# Patient Record
Sex: MALE | Race: WHITE | NOT HISPANIC OR LATINO | URBAN - METROPOLITAN AREA
[De-identification: names, ages, dates, MRNs, and addresses within clinical notes are randomized per-mention and may not be internally consistent; named-entity substitution may affect disease eponyms.]

---

## 2018-02-22 ENCOUNTER — OUTSIDE PLACE OF SERVICE (OUTPATIENT)
Dept: ADMINISTRATIVE | Facility: OTHER | Age: 63
End: 2018-02-22
Payer: COMMERCIAL

## 2018-02-22 PROCEDURE — 99232 SBSQ HOSP IP/OBS MODERATE 35: CPT | Mod: ,,, | Performed by: THORACIC SURGERY (CARDIOTHORACIC VASCULAR SURGERY)

## 2018-02-26 ENCOUNTER — OUTSIDE PLACE OF SERVICE (OUTPATIENT)
Dept: ADMINISTRATIVE | Facility: OTHER | Age: 63
End: 2018-02-26
Payer: COMMERCIAL

## 2018-02-26 PROCEDURE — 33430 REPLACEMENT OF MITRAL VALVE: CPT | Mod: AS,,, | Performed by: NURSE PRACTITIONER

## 2018-02-26 PROCEDURE — 33511 CABG VEIN TWO: CPT | Mod: 51,,, | Performed by: THORACIC SURGERY (CARDIOTHORACIC VASCULAR SURGERY)

## 2018-02-26 PROCEDURE — 33508 ENDOSCOPIC VEIN HARVEST: CPT | Mod: ,,, | Performed by: THORACIC SURGERY (CARDIOTHORACIC VASCULAR SURGERY)

## 2018-02-26 PROCEDURE — 33511 CABG VEIN TWO: CPT | Mod: 51,AS,, | Performed by: NURSE PRACTITIONER

## 2018-02-26 PROCEDURE — 33259 ABLATE ATRIA W/BYPASS ADD-ON: CPT | Mod: ,,, | Performed by: THORACIC SURGERY (CARDIOTHORACIC VASCULAR SURGERY)

## 2018-02-26 PROCEDURE — 33430 REPLACEMENT OF MITRAL VALVE: CPT | Mod: ,,, | Performed by: THORACIC SURGERY (CARDIOTHORACIC VASCULAR SURGERY)

## 2018-02-26 PROCEDURE — 33259 ABLATE ATRIA W/BYPASS ADD-ON: CPT | Mod: AS,,, | Performed by: NURSE PRACTITIONER

## 2018-03-22 RX ORDER — ASPIRIN 325 MG
325 TABLET ORAL
COMMUNITY
Start: 2018-03-10

## 2018-03-22 RX ORDER — METFORMIN HYDROCHLORIDE 1000 MG/1
1000 TABLET ORAL
COMMUNITY

## 2018-03-22 RX ORDER — GLIMEPIRIDE 2 MG/1
2 TABLET ORAL
COMMUNITY
End: 2018-05-29

## 2018-03-22 RX ORDER — BENZONATATE 100 MG/1
100 CAPSULE ORAL
COMMUNITY
Start: 2018-03-09

## 2018-03-22 RX ORDER — PANTOPRAZOLE SODIUM 40 MG/1
40 TABLET, DELAYED RELEASE ORAL
COMMUNITY
Start: 2018-03-10

## 2018-03-22 RX ORDER — ATORVASTATIN CALCIUM 40 MG/1
40 TABLET, FILM COATED ORAL
COMMUNITY

## 2018-03-22 RX ORDER — WARFARIN 6 MG/1
6 TABLET ORAL
COMMUNITY
Start: 2018-03-09

## 2018-03-22 RX ORDER — LISINOPRIL 2.5 MG/1
2.5 TABLET ORAL
COMMUNITY
Start: 2018-03-10 | End: 2018-05-29

## 2018-03-22 RX ORDER — FUROSEMIDE 20 MG/1
20 TABLET ORAL
COMMUNITY
Start: 2018-03-09

## 2018-03-27 ENCOUNTER — OFFICE VISIT (OUTPATIENT)
Dept: VASCULAR SURGERY | Facility: CLINIC | Age: 63
End: 2018-03-27
Payer: COMMERCIAL

## 2018-03-27 VITALS
HEIGHT: 72 IN | DIASTOLIC BLOOD PRESSURE: 88 MMHG | BODY MASS INDEX: 29.65 KG/M2 | WEIGHT: 218.94 LBS | SYSTOLIC BLOOD PRESSURE: 143 MMHG | HEART RATE: 90 BPM

## 2018-03-27 DIAGNOSIS — Z95.4 HISTORY OF MITRAL VALVE REPLACEMENT WITH TISSUE GRAFT: ICD-10-CM

## 2018-03-27 DIAGNOSIS — I48.19 PERSISTENT ATRIAL FIBRILLATION: ICD-10-CM

## 2018-03-27 DIAGNOSIS — Z95.1 S/P CABG (CORONARY ARTERY BYPASS GRAFT): ICD-10-CM

## 2018-03-27 PROCEDURE — 99024 POSTOP FOLLOW-UP VISIT: CPT | Mod: S$GLB,,, | Performed by: THORACIC SURGERY (CARDIOTHORACIC VASCULAR SURGERY)

## 2018-03-27 PROCEDURE — 99999 PR PBB SHADOW E&M-EST. PATIENT-LVL III: CPT | Mod: PBBFAC,,, | Performed by: THORACIC SURGERY (CARDIOTHORACIC VASCULAR SURGERY)

## 2018-03-27 RX ORDER — HYDROCODONE BITARTRATE AND ACETAMINOPHEN 5; 325 MG/1; MG/1
TABLET ORAL
COMMUNITY
Start: 2018-03-09

## 2018-03-27 NOTE — PROGRESS NOTES
CLINIC NOTE    HISTORY OF PRESENT ILLNESS:  Mr. Prutet is a 62-year-old man who was admitted to   [facility] with congestive heart failure.  He was found to have   severe mitral valve insufficiency, atrial fibrillation, and coronary artery   disease.  On 02/26/2018, he underwent mitral valve replacement with a 27-mm   Medtronic Mosaic porcine valve, coronary artery bypass x3 with saphenous vein   grafts to diagonal #2 and the right posterior descending and a complete maze   procedure with resection of the left atrial appendage.  He has done well since   surgery.  He has a cough, which is improving.  His edema is improving as well.    Overall, he is feeling well.  He has increased activities within restrictions.    PHYSICAL EXAMINATION:  VITAL SIGNS:  Blood pressure 143/88, heart rate 90, weight 99.3 kilograms.  CHEST:  Lungs are clear bilaterally with good full breath sounds.  Sternotomy   incisions are healing well.  Sternum is stable.  HEART:  Mostly regular with frequent extrasystoles and no murmur.  EXTREMITIES:  Trace edema on left and 1+ edema on right lower extremity.    ASSESSMENT:  The patient recovering well from multiple cardiac procedures as   outlined above.    PLAN:  Continue current medications (I have reviewed his current medical   management).  At some point if it is felt that he should progress off of   warfarin on to another anticoagulant, I am fine with that from the standpoint of   the tissue valve.  It should be noted that he did have resection of his left   atrial appendage and may not need lifelong anticoagulation anymore because of   that.  I will follow him in 7 weeks.      SIMONE/LORNA  dd: 03/27/2018 11:18:41 (CDT)  td: 03/27/2018 23:54:00 (CDT)  Doc ID   #[id number]  Job ID #[id number]    CC: Chance Ernst M.D.

## 2018-05-09 ENCOUNTER — TELEPHONE (OUTPATIENT)
Dept: VASCULAR SURGERY | Facility: CLINIC | Age: 63
End: 2018-05-09

## 2018-05-09 NOTE — TELEPHONE ENCOUNTER
Notified that we are only in the clinic on Tuesday. He has rehab on M,W,F.  Appt moved to 1:00 so she can work half a day.

## 2018-05-09 NOTE — TELEPHONE ENCOUNTER
----- Message from Hazel Kurtz sent at 5/9/2018  2:30 PM CDT -----  Contact: Pt's Wife  She is calling in regards to see if the pt can have his appt rescheduled for either Monday, Wednesday or Friday after 230 pm even if it is the a following week if possible and can be reached at [phone].

## 2018-05-29 ENCOUNTER — OFFICE VISIT (OUTPATIENT)
Dept: VASCULAR SURGERY | Facility: CLINIC | Age: 63
End: 2018-05-29
Payer: COMMERCIAL

## 2018-05-29 VITALS
SYSTOLIC BLOOD PRESSURE: 153 MMHG | HEART RATE: 76 BPM | HEIGHT: 72 IN | DIASTOLIC BLOOD PRESSURE: 68 MMHG | WEIGHT: 220.69 LBS | BODY MASS INDEX: 29.89 KG/M2

## 2018-05-29 DIAGNOSIS — Z95.1 S/P CABG (CORONARY ARTERY BYPASS GRAFT): Primary | ICD-10-CM

## 2018-05-29 DIAGNOSIS — Z95.4 HISTORY OF MITRAL VALVE REPLACEMENT WITH TISSUE GRAFT: ICD-10-CM

## 2018-05-29 PROCEDURE — 99024 POSTOP FOLLOW-UP VISIT: CPT | Mod: S$GLB,,, | Performed by: THORACIC SURGERY (CARDIOTHORACIC VASCULAR SURGERY)

## 2018-05-29 PROCEDURE — 99999 PR PBB SHADOW E&M-EST. PATIENT-LVL III: CPT | Mod: PBBFAC,,, | Performed by: THORACIC SURGERY (CARDIOTHORACIC VASCULAR SURGERY)

## 2018-05-29 RX ORDER — VALSARTAN 40 MG/1
20 TABLET ORAL
COMMUNITY

## 2018-05-29 NOTE — PROGRESS NOTES
CLINIC NOTE    HISTORY OF PRESENT ILLNESS:  Mr. Pruett is a pleasant 62-year-old man who   underwent combined mitral valve replacement with a 27-mm Medtronic Mosaic   porcine valve, coronary artery bypass x3 with vein grafts to diagonal #2 and   right posterior descending, and complete maze procedure including resection of   the left atrial appendage at [facility] on 02/26/2018.  He has   done quite well from surgery.  His congestive heart failure symptoms have   resolved.  He has been active within therapy.  Overall, he feels quite well.  He   does not notice any palpitations.    MEDICATIONS:  Reviewed and currently include warfarin.    PHYSICAL EXAMINATION:  VITAL SIGNS:  Blood pressure 153/68, heart rate 76, weight 100.4 kg.  GENERAL:  He looks quite well.  CHEST:  Sternum is stable.  Sternotomy incisions are healed.  HEART:  Regular rate and rhythm.  No murmur or rub.  The heart rhythm is   regularly irregular essentially.  LUNGS:  Clear bilaterally.  EXTREMITIES:  No edema.    IMPRESSION:  The patient has recovered well from combined cardiac surgical   procedures.    PLAN:  1.  The patient is free to pursue activities without restrictions at this point.  2.  From cardiac surgical standpoint regarding his mitral valve, he no longer   requires warfarin since he has a tissue valve.  He is receiving warfarin;   however, also for his paroxysmal atrial fibrillation, which potentially is   resolved.  Regardless of that, he has undergone resection of his left atrial   appendage.  If it is desired by Cardiology that he continue with his   anticoagulation, he could potentially be changed to a separate medication.  He   is concerned that warfarin may be causing him to lose his hair.      WILIAND/HN  dd: 05/30/2018 20:47:11 (CDT)  td: 05/31/2018 06:21:05 (CDT)  Doc ID   #[id number]  Job ID #[id number]    CC: [facility]